# Patient Record
Sex: MALE | Race: WHITE | NOT HISPANIC OR LATINO | URBAN - METROPOLITAN AREA
[De-identification: names, ages, dates, MRNs, and addresses within clinical notes are randomized per-mention and may not be internally consistent; named-entity substitution may affect disease eponyms.]

---

## 2022-04-04 ENCOUNTER — OFFICE (OUTPATIENT)
Dept: URBAN - METROPOLITAN AREA CLINIC 11 | Facility: CLINIC | Age: 27
End: 2022-04-04

## 2022-04-04 VITALS
SYSTOLIC BLOOD PRESSURE: 127 MMHG | HEART RATE: 86 BPM | DIASTOLIC BLOOD PRESSURE: 73 MMHG | WEIGHT: 149 LBS | OXYGEN SATURATION: 96 %

## 2022-04-04 DIAGNOSIS — K64.4 RESIDUAL HEMORRHOIDAL SKIN TAGS: ICD-10-CM

## 2022-04-04 DIAGNOSIS — K62.5 HEMORRHAGE OF ANUS AND RECTUM: ICD-10-CM

## 2022-04-04 PROCEDURE — 99204 OFFICE O/P NEW MOD 45 MIN: CPT | Performed by: INTERNAL MEDICINE

## 2022-04-04 NOTE — SERVICEHPINOTES
Mr. Garcia is a 26-year-old man here for evaluation of rectal bleeding. The patient was actually seen by me in 2016 with similar complaint. At that time he was found have an anal fissure on exam it was treated with hydrocortisone. We did discuss the possibility of flexible sigmoidoscopy or colonoscopy but at that time his symptoms resolved and he was lost to follow-up. He now comes in stating that he had a bowel movement with some straining around two or three weeks ago and at that time had a small amount of bright red blood per rectum which she states was about the size of two quarters. He states that he did use a preparation H suppository and has not had any bleeding since that one time. He denies any rectal pain or stinging at that time. He denies any protrusions. He denies any fevers, chills, nausea, vomiting, or abdominal pain. He does not take any blood thinners. He does work out on a regular basis. There is no first-degree family history of colon cancer or colon polyps.

## 2022-04-04 NOTE — SERVICENOTES
Overall the patient is doing relatively well.  He did have one episode of bright red blood per rectum after a straining bowel movement.  He has not had any since then.  I do think it is reasonable, especially since he has had issues with rectal bleeding in the past, that he undergo endoscopic evaluation.  We did discuss full colonoscopy versus un sedated flexible sigmoidoscopy in the patient states that he would prefer un sedated flexible sigmoidoscopy.  He was instructed to notify us if rectal bleeding returns.  We did discuss the possible causes of rectal bleeding including hemorrhoids, anal fissure, colitis, polyp, or even cancer.

## 2023-02-27 ENCOUNTER — AMBULATORY SURGICAL CENTER (OUTPATIENT)
Dept: URBAN - METROPOLITAN AREA SURGERY 3 | Facility: SURGERY | Age: 28
End: 2023-02-27

## 2023-02-27 VITALS
DIASTOLIC BLOOD PRESSURE: 67 MMHG | TEMPERATURE: 98.4 F | HEART RATE: 69 BPM | OXYGEN SATURATION: 93 % | TEMPERATURE: 98.2 F | RESPIRATION RATE: 41 BRPM | DIASTOLIC BLOOD PRESSURE: 75 MMHG | TEMPERATURE: 98.4 F | DIASTOLIC BLOOD PRESSURE: 76 MMHG | HEART RATE: 71 BPM | SYSTOLIC BLOOD PRESSURE: 131 MMHG | DIASTOLIC BLOOD PRESSURE: 76 MMHG | WEIGHT: 150 LBS | WEIGHT: 150 LBS | HEART RATE: 60 BPM | SYSTOLIC BLOOD PRESSURE: 132 MMHG | HEART RATE: 69 BPM | SYSTOLIC BLOOD PRESSURE: 131 MMHG | DIASTOLIC BLOOD PRESSURE: 75 MMHG | RESPIRATION RATE: 18 BRPM | SYSTOLIC BLOOD PRESSURE: 132 MMHG | RESPIRATION RATE: 41 BRPM | RESPIRATION RATE: 18 BRPM | SYSTOLIC BLOOD PRESSURE: 116 MMHG | DIASTOLIC BLOOD PRESSURE: 67 MMHG | SYSTOLIC BLOOD PRESSURE: 116 MMHG | HEART RATE: 71 BPM | TEMPERATURE: 98.2 F | HEART RATE: 60 BPM | OXYGEN SATURATION: 93 %

## 2023-02-27 DIAGNOSIS — K64.1 SECOND DEGREE HEMORRHOIDS: ICD-10-CM

## 2023-02-27 DIAGNOSIS — K64.0 FIRST DEGREE HEMORRHOIDS: ICD-10-CM

## 2023-02-27 DIAGNOSIS — K62.5 HEMORRHAGE OF ANUS AND RECTUM: ICD-10-CM

## 2023-02-27 PROCEDURE — 45330 DIAGNOSTIC SIGMOIDOSCOPY: CPT | Performed by: INTERNAL MEDICINE

## 2023-02-27 RX ORDER — HYDROCORTISONE 25 MG/G
CREAM TOPICAL
Qty: 20 | Refills: 1 | Status: COMPLETED
Start: 2023-02-27 | End: 2024-08-15

## 2023-11-14 ENCOUNTER — OFFICE (OUTPATIENT)
Dept: URBAN - METROPOLITAN AREA CLINIC 12 | Facility: CLINIC | Age: 28
End: 2023-11-14

## 2023-11-14 VITALS
SYSTOLIC BLOOD PRESSURE: 123 MMHG | HEART RATE: 76 BPM | OXYGEN SATURATION: 97 % | WEIGHT: 158 LBS | DIASTOLIC BLOOD PRESSURE: 76 MMHG

## 2023-11-14 DIAGNOSIS — K64.1 SECOND DEGREE HEMORRHOIDS: ICD-10-CM

## 2023-11-14 DIAGNOSIS — K60.2 ANAL FISSURE, UNSPECIFIED: ICD-10-CM

## 2023-11-14 DIAGNOSIS — K62.5 HEMORRHAGE OF ANUS AND RECTUM: ICD-10-CM

## 2023-11-14 PROCEDURE — 99214 OFFICE O/P EST MOD 30 MIN: CPT

## 2023-11-14 NOTE — SERVICEHPINOTES
Mr. Garcia is a 26-year-old man here for follow up on rectal bleeding. Patient underwent a flex sig by Dr. Villatoro on 2/2023 which showed normal mucosa in the distal descending colon, sigmoid colon, rectosigmoid junction and rectum, grade/Stage II internal hemorrhoids and grade/Stage I external hemorrhoids. Patient comes in today states that he continues to have rectal bleeding. He states that it occurs ~ 4 times/month. He states that it is usually bright red blood that fill the toilet. He reports some burning but denies any rectal pain, or protrusion. He denies any issues with constipation, diarrhea, hard stool or straining. He denies any FH of colon polyps or colon cancer. He states that he hasn't been doing sitz bath or taking fiber.  
br
br Patient was initially seen by Dr. Villatoro on 4/2022 for evaluation of rectal bleeding. The patient was actually seen by me in 2016 with similar complaint. At that time he was found have an anal fissure on exam it was treated with hydrocortisone. We did discuss the possibility of flexible sigmoidoscopy or colonoscopy but at that time his symptoms resolved and he was lost to follow-up. He now comes in stating that he had a bowel movement with some straining around two or three weeks ago and at that time had a small amount of bright red blood per rectum which she states was about the size of two quarters. He states that he did use a preparation H suppository and has not had any bleeding since that one time. He denies any rectal pain or stinging at that time. He denies any protrusions. He denies any fevers, chills, nausea, vomiting, or abdominal pain. He does not take any blood thinners. He does work out on a regular basis. There is no first-degree family history of colon cancer or colon polyps.

## 2023-11-14 NOTE — SERVICENOTES
MD Addendum: The patient was seen along with JAZ Martinez.  I have evaluated the patient, discussed with her, and agree with the above documented findings, impression, and plan of care.  Patient does appear to have anal fissure on exam and so we will give a trial of nifedipine gel.  If no improvement with this then can consider hemorrhoidal banding after his insurance issues have been resolved.-[email or web address]

## 2024-07-22 ENCOUNTER — OFFICE (OUTPATIENT)
Dept: URBAN - METROPOLITAN AREA CLINIC 11 | Facility: CLINIC | Age: 29
End: 2024-07-22

## 2024-07-22 VITALS
HEART RATE: 73 BPM | DIASTOLIC BLOOD PRESSURE: 72 MMHG | SYSTOLIC BLOOD PRESSURE: 123 MMHG | OXYGEN SATURATION: 97 % | WEIGHT: 161 LBS

## 2024-07-22 DIAGNOSIS — K64.1 SECOND DEGREE HEMORRHOIDS: ICD-10-CM

## 2024-07-22 DIAGNOSIS — K62.5 HEMORRHAGE OF ANUS AND RECTUM: ICD-10-CM

## 2024-07-22 DIAGNOSIS — Z83.79 FAMILY HISTORY OF OTHER DISEASES OF THE DIGESTIVE SYSTEM: ICD-10-CM

## 2024-07-22 PROCEDURE — 99213 OFFICE O/P EST LOW 20 MIN: CPT

## 2024-07-22 RX ORDER — POLYETHYLENE GLYCOL 3350, SODIUM SULFATE, SODIUM CHLORIDE, POTASSIUM CHLORIDE, ASCORBIC ACID, SODIUM ASCORBATE 140-9-5.2G
KIT ORAL
Qty: 1 | Refills: 0 | Status: COMPLETED
Start: 2024-07-22 | End: 2024-08-15

## 2024-07-24 LAB
C-REACTIVE PROTEIN, QUANT: <1 MG/L
CBC, PLATELET, NO DIFFERENTIAL: HEMATOCRIT: 40.4 % (ref 37.5–51)
CBC, PLATELET, NO DIFFERENTIAL: HEMOGLOBIN: 13.9 G/DL (ref 13–17.7)
CBC, PLATELET, NO DIFFERENTIAL: MCH: 30.1 PG (ref 26.6–33)
CBC, PLATELET, NO DIFFERENTIAL: MCHC: 34.4 G/DL (ref 31.5–35.7)
CBC, PLATELET, NO DIFFERENTIAL: MCV: 87 FL (ref 79–97)
CBC, PLATELET, NO DIFFERENTIAL: PLATELETS: 193 X10E3/UL (ref 150–450)
CBC, PLATELET, NO DIFFERENTIAL: RBC: 4.62 X10E6/UL (ref 4.14–5.8)
CBC, PLATELET, NO DIFFERENTIAL: RDW: 12.2 % (ref 11.6–15.4)
CBC, PLATELET, NO DIFFERENTIAL: WBC: 4.6 X10E3/UL (ref 3.4–10.8)
IBD EXPANDED PANEL: ACCA: 3 UNITS (ref 0–90)
IBD EXPANDED PANEL: ALCA: 6 UNITS (ref 0–60)
IBD EXPANDED PANEL: AMCA: 32 UNITS (ref 0–100)
IBD EXPANDED PANEL: ATYPICAL PANCA: NEGATIVE
IBD EXPANDED PANEL: COMMENTS: (no result)
IBD EXPANDED PANEL: GASCA: 30 UNITS (ref 0–50)

## 2024-08-15 PROBLEM — K92.1 HEMATOCHEZIA: Status: ACTIVE | Noted: 2024-08-15
